# Patient Record
Sex: MALE | Race: WHITE | NOT HISPANIC OR LATINO | URBAN - METROPOLITAN AREA
[De-identification: names, ages, dates, MRNs, and addresses within clinical notes are randomized per-mention and may not be internally consistent; named-entity substitution may affect disease eponyms.]

---

## 2017-06-19 ENCOUNTER — OFFICE (AMBULATORY)
Dept: URBAN - METROPOLITAN AREA CLINIC 64 | Facility: CLINIC | Age: 72
End: 2017-06-19

## 2017-06-19 VITALS
HEIGHT: 64 IN | WEIGHT: 200 LBS | DIASTOLIC BLOOD PRESSURE: 65 MMHG | SYSTOLIC BLOOD PRESSURE: 115 MMHG | HEART RATE: 70 BPM

## 2017-06-19 DIAGNOSIS — K59.00 CONSTIPATION, UNSPECIFIED: ICD-10-CM

## 2017-06-19 DIAGNOSIS — R19.4 CHANGE IN BOWEL HABIT: ICD-10-CM

## 2017-06-19 PROCEDURE — 99202 OFFICE O/P NEW SF 15 MIN: CPT | Performed by: NURSE PRACTITIONER

## 2019-10-15 RX ORDER — LEVOTHYROXINE SODIUM 137 UG/1
137 TABLET ORAL DAILY
COMMUNITY

## 2019-10-15 RX ORDER — KETOCONAZOLE 20 MG/ML
SHAMPOO TOPICAL 2 TIMES WEEKLY
COMMUNITY

## 2019-10-15 RX ORDER — CYCLOBENZAPRINE HCL 5 MG
5 TABLET ORAL 2 TIMES DAILY PRN
COMMUNITY

## 2019-10-15 RX ORDER — SERTRALINE HYDROCHLORIDE 100 MG/1
100 TABLET, FILM COATED ORAL DAILY
COMMUNITY

## 2019-10-15 RX ORDER — FUROSEMIDE 40 MG/1
40 TABLET ORAL DAILY
COMMUNITY

## 2019-10-15 RX ORDER — WARFARIN SODIUM 5 MG/1
5 TABLET ORAL
COMMUNITY

## 2019-10-15 RX ORDER — MONTELUKAST SODIUM 10 MG/1
10 TABLET ORAL NIGHTLY
COMMUNITY

## 2019-10-15 RX ORDER — SIMVASTATIN 40 MG
40 TABLET ORAL NIGHTLY
COMMUNITY

## 2019-10-15 RX ORDER — POTASSIUM CHLORIDE 1.5 G/1.77G
20 POWDER, FOR SOLUTION ORAL DAILY
COMMUNITY

## 2019-10-15 RX ORDER — CARBOXYMETHYLCELLULOSE SODIUM 5 MG/ML
SOLUTION/ DROPS OPHTHALMIC 3 TIMES DAILY PRN
COMMUNITY

## 2019-10-15 RX ORDER — CLINDAMYCIN PHOSPHATE 10 UG/ML
LOTION TOPICAL 2 TIMES DAILY
COMMUNITY

## 2019-10-15 RX ORDER — KETOTIFEN FUMARATE 0.35 MG/ML
1 SOLUTION/ DROPS OPHTHALMIC 2 TIMES DAILY
COMMUNITY

## 2019-10-15 RX ORDER — CETIRIZINE HYDROCHLORIDE 10 MG/1
10 TABLET ORAL DAILY
COMMUNITY

## 2019-10-15 RX ORDER — LEVETIRACETAM 250 MG/1
250 TABLET ORAL 2 TIMES DAILY
COMMUNITY

## 2019-10-15 RX ORDER — BACLOFEN 20 MG/1
20 TABLET ORAL 3 TIMES DAILY
COMMUNITY

## 2019-10-17 ENCOUNTER — ANESTHESIA EVENT (OUTPATIENT)
Dept: GASTROENTEROLOGY | Facility: HOSPITAL | Age: 74
End: 2019-10-17

## 2019-10-18 ENCOUNTER — ANESTHESIA (OUTPATIENT)
Dept: GASTROENTEROLOGY | Facility: HOSPITAL | Age: 74
End: 2019-10-18

## 2019-10-18 ENCOUNTER — HOSPITAL ENCOUNTER (OUTPATIENT)
Facility: HOSPITAL | Age: 74
Setting detail: HOSPITAL OUTPATIENT SURGERY
Discharge: HOME | End: 2019-10-18
Attending: INTERNAL MEDICINE | Admitting: INTERNAL MEDICINE

## 2019-10-18 ENCOUNTER — ANESTHESIA EVENT (OUTPATIENT)
Dept: GASTROENTEROLOGY | Facility: HOSPITAL | Age: 74
End: 2019-10-18

## 2019-10-18 ENCOUNTER — ON CAMPUS - OUTPATIENT (AMBULATORY)
Dept: URBAN - METROPOLITAN AREA HOSPITAL 85 | Facility: HOSPITAL | Age: 74
End: 2019-10-18

## 2019-10-18 VITALS
OXYGEN SATURATION: 95 % | TEMPERATURE: 98.1 F | RESPIRATION RATE: 15 BRPM | HEIGHT: 64 IN | HEART RATE: 70 BPM | DIASTOLIC BLOOD PRESSURE: 53 MMHG | BODY MASS INDEX: 32.82 KG/M2 | SYSTOLIC BLOOD PRESSURE: 119 MMHG | WEIGHT: 192.24 LBS

## 2019-10-18 DIAGNOSIS — D12.5 BENIGN NEOPLASM OF SIGMOID COLON: ICD-10-CM

## 2019-10-18 DIAGNOSIS — Z80.0 FAMILY HISTORY OF MALIGNANT NEOPLASM OF DIGESTIVE ORGANS: ICD-10-CM

## 2019-10-18 DIAGNOSIS — D12.3 BENIGN NEOPLASM OF TRANSVERSE COLON: ICD-10-CM

## 2019-10-18 DIAGNOSIS — Z86.010 PERSONAL HISTORY OF COLONIC POLYPS: ICD-10-CM

## 2019-10-18 DIAGNOSIS — K64.1 SECOND DEGREE HEMORRHOIDS: ICD-10-CM

## 2019-10-18 DIAGNOSIS — Z86.010 PERSONAL HISTORY OF COLONIC POLYPS: Primary | ICD-10-CM

## 2019-10-18 DIAGNOSIS — K57.30 DIVERTICULOSIS OF LARGE INTESTINE WITHOUT PERFORATION OR ABS: ICD-10-CM

## 2019-10-18 PROCEDURE — 25010000002 PROPOFOL 200 MG/20ML EMULSION: Performed by: ANESTHESIOLOGY

## 2019-10-18 PROCEDURE — 88305 TISSUE EXAM BY PATHOLOGIST: CPT | Performed by: INTERNAL MEDICINE

## 2019-10-18 PROCEDURE — 45385 COLONOSCOPY W/LESION REMOVAL: CPT | Mod: PT | Performed by: INTERNAL MEDICINE

## 2019-10-18 PROCEDURE — 25010000002 GENTAMICIN PER 80 MG: Performed by: INTERNAL MEDICINE

## 2019-10-18 RX ORDER — SORBITOL SOLUTION 70 %
50 SOLUTION, ORAL MISCELLANEOUS
Status: DISPENSED | OUTPATIENT
Start: 2019-10-18 | End: 2019-10-18

## 2019-10-18 RX ORDER — SODIUM CHLORIDE 9 MG/ML
30 INJECTION, SOLUTION INTRAVENOUS CONTINUOUS PRN
Status: DISCONTINUED | OUTPATIENT
Start: 2019-10-18 | End: 2019-10-18 | Stop reason: HOSPADM

## 2019-10-18 RX ORDER — SODIUM CHLORIDE 0.9 % (FLUSH) 0.9 %
3 SYRINGE (ML) INJECTION EVERY 12 HOURS SCHEDULED
Status: DISCONTINUED | OUTPATIENT
Start: 2019-10-18 | End: 2019-10-18 | Stop reason: HOSPADM

## 2019-10-18 RX ORDER — SODIUM CHLORIDE 0.9 % (FLUSH) 0.9 %
10 SYRINGE (ML) INJECTION AS NEEDED
Status: DISCONTINUED | OUTPATIENT
Start: 2019-10-18 | End: 2019-10-18 | Stop reason: HOSPADM

## 2019-10-18 RX ORDER — SODIUM CHLORIDE 9 MG/ML
9 INJECTION, SOLUTION INTRAVENOUS CONTINUOUS PRN
Status: DISCONTINUED | OUTPATIENT
Start: 2019-10-18 | End: 2019-10-18 | Stop reason: HOSPADM

## 2019-10-18 RX ORDER — SODIUM CHLORIDE 0.9 % (FLUSH) 0.9 %
3-10 SYRINGE (ML) INJECTION AS NEEDED
Status: DISCONTINUED | OUTPATIENT
Start: 2019-10-18 | End: 2019-10-18 | Stop reason: HOSPADM

## 2019-10-18 RX ORDER — PROPOFOL 10 MG/ML
INJECTION, EMULSION INTRAVENOUS AS NEEDED
Status: DISCONTINUED | OUTPATIENT
Start: 2019-10-18 | End: 2019-10-18 | Stop reason: SURG

## 2019-10-18 RX ORDER — LIDOCAINE HYDROCHLORIDE 20 MG/ML
INJECTION, SOLUTION EPIDURAL; INFILTRATION; INTRACAUDAL; PERINEURAL AS NEEDED
Status: DISCONTINUED | OUTPATIENT
Start: 2019-10-18 | End: 2019-10-18 | Stop reason: SURG

## 2019-10-18 RX ORDER — PHENYLEPHRINE HCL IN 0.9% NACL 0.5 MG/5ML
SYRINGE (ML) INTRAVENOUS AS NEEDED
Status: DISCONTINUED | OUTPATIENT
Start: 2019-10-18 | End: 2019-10-18 | Stop reason: SURG

## 2019-10-18 RX ADMIN — GENTAMICIN SULFATE 80 MG: 40 INJECTION, SOLUTION INTRAMUSCULAR; INTRAVENOUS at 12:50

## 2019-10-18 RX ADMIN — LIDOCAINE HYDROCHLORIDE 100 MG: 20 INJECTION, SOLUTION EPIDURAL; INFILTRATION; INTRACAUDAL; PERINEURAL at 07:35

## 2019-10-18 RX ADMIN — SODIUM CHLORIDE 9 ML/HR: 0.9 INJECTION, SOLUTION INTRAVENOUS at 07:03

## 2019-10-18 RX ADMIN — PROPOFOL 700 MG: 10 INJECTION, EMULSION INTRAVENOUS at 15:13

## 2019-10-18 RX ADMIN — SORBITOL SOLUTION (BULK) 50 ML: 70 SOLUTION at 08:32

## 2019-10-18 RX ADMIN — PHENYLEPHRINE HYDROCHLORIDE 100 MCG: 10 INJECTION INTRAVENOUS at 14:33

## 2019-10-18 RX ADMIN — PROPOFOL 100 MG: 10 INJECTION, EMULSION INTRAVENOUS at 07:35

## 2019-10-18 NOTE — ANESTHESIA PREPROCEDURE EVALUATION
Anesthesia Evaluation     Patient summary reviewed and Nursing notes reviewed   NPO Solid Status: > 8 hours  NPO Liquid Status: > 8 hours           Airway   Dental      Pulmonary    (+) COPD moderate, asthma,   Cardiovascular     (+) hypertension, CHF, hyperlipidemia,       Neuro/Psych  (+) seizures, CVA residual symptoms,       ROS Comment: L hemiparesis  GI/Hepatic/Renal/Endo    (+)  GERD,      Musculoskeletal     Abdominal    Substance History      OB/GYN          Other   (+) arthritis         Phys Exam Other: See previous preop evaluation, from this am.                Anesthesia Plan    ASA 3     MAC     Anesthetic plan, all risks, benefits, and alternatives have been provided, discussed and informed consent has been obtained with: patient.

## 2019-10-18 NOTE — H&P
" LOS: 0 days   Patient Care Team:  Miles Peña IV, MD as PCP - General (Internal Medicine)      Subjective     Interval History:     Subjective: Family history of colon cancer      ROS:   No chest pain, shortness of breath, or cough.  No melena or hematochezia  No change since scanned H&P       Medication Review:     Current Facility-Administered Medications:   •  ampicillin 2 gm IVPB in 100 ml NS (MBP), 2 g, Intravenous, Once, Mo Riggins MD  •  sodium chloride 0.9 % flush 10 mL, 10 mL, Intravenous, PRN, Mo Riggins MD  •  sodium chloride 0.9 % flush 10 mL, 10 mL, Intravenous, PRN, Mo Riggins MD  •  sodium chloride 0.9 % flush 3 mL, 3 mL, Intravenous, Q12H, Catie Lobato CRNA  •  sodium chloride 0.9 % flush 3 mL, 3 mL, Intravenous, Q12H, Mo Riggins MD  •  sodium chloride 0.9 % flush 3 mL, 3 mL, Intravenous, Q12H, Mo Riggins MD  •  sodium chloride 0.9 % flush 3-10 mL, 3-10 mL, Intravenous, PRN, Catie Lobato CRNA  •  sodium chloride 0.9 % infusion, 9 mL/hr, Intravenous, Continuous PRN, Catie Lobato CRNA, Last Rate: 9 mL/hr at 10/18/19 0703  •  sodium chloride 0.9 % infusion, 30 mL/hr, Intravenous, Continuous PRN, Mo Riggins MD  •  sodium chloride 0.9 % infusion, 30 mL/hr, Intravenous, Continuous PRN, Mo Riggins MD      Objective     Vital Signs  Vitals:    10/18/19 0645 10/18/19 0743 10/18/19 0800 10/18/19 0813   BP: 123/62 120/56 133/58 126/69   BP Location: Right arm      Patient Position: Lying      Pulse: 70 70 70 70   Resp: 16 14 14 15   Temp: 98.1 °F (36.7 °C)      TempSrc: Oral      SpO2: 95% 100% 100% 95%   Weight: 87.2 kg (192 lb 3.9 oz)      Height: 162.6 cm (64\")          Physical Exam:    General Appearance:    Awake and alert, in no acute distress   Head:    Normocephalic, without obvious abnormality   Eyes:          Conjunctivae normal, anicteric sclera   Throat:   No oral lesions, no thrush, oral mucosa " moist   Neck:   No adenopathy, supple, no JVD   CV/Lungs:     RRR, respirations regular, even and unlabored   Abdomen:     Soft, non-tender, no rebound or guarding, non-distended, no hepatosplenomegaly   Rectal:     Deferred   Extremities:   No edema, no cyanosis   Skin:   No bruising or rash, no jaundice        Results Review:    Lab Results (last 24 hours)     Procedure Component Value Units Date/Time    Protime-INR [[id number]] Updated:  10/18/19 0706    Specimen:  Blood           Imaging Results (last 24 hours)     ** No results found for the last 24 hours. **            Assessment/Plan   Proceed with scope and MAC anesthesia    Proceed with surveillance colonoscopy    Mo Riggins MD  10/18/19  1:52 PM

## 2019-10-18 NOTE — OP NOTE
COLONOSCOPY  Procedure Report    Patient Name:  Elia Manuel  YOB: 1945    Date of Surgery:  10/18/2019     Indications: Family history of colon cancer  Pre-op Diagnosis:   PERSONAL HX COLON POLYPS/ FAM HX COLON CANCER         Post-op Diagnosis:  Inadequate prep, procedure aborted      Procedure(s):  COLONOSCOPY    Staff:  Surgeon(s):  Mo Riggins MD         Anesthesia: Monitored Anesthesia Care    Estimated Blood Loss: None  Implants:    Nothing was implanted during the procedure    Specimen:          None    Complications:  None    Description of Procedure:  Informed consent was obtained from the patient.  They were placed in the left lateral decubitus position and IV conscious sedation was administered by anesthesia while being monitored continuously throughout the procedure.  Digital rectal exam was performed revealing thick stool in the rectal vault.  The scope was introduced into the rectum and the prep was completely inadequate to proceed and the procedure was aborted.    After additional prep the patient was brought back and re-sedated in the left lateral decubitus position.  Digital rectal exam revealed no external hemorrhoid fissure or fistula digital exam of the anal canal rectal vault was unremarkable.  The video Olympus colonoscope was introduced into the rectum and with great difficulty due to a long and tortuous colon and fairly poor prep, I was able to reach the cecum and positively identified and photographed the ileocecal valve and appendiceal orifice.  The prep was fair in the ascending and transverse colon and fair to poor in the descending and sigmoid colon although with over 3 L of lavage and suctioning, we had reasonable visualization of the mucosal and were able to identify a transverse colon polyp that was 1 cm snared with electrocautery hemostasis and removed.  An additional 8 mm semi-pedunculated descending colon polyp was snared and removed with  electrocautery hemostasis.  Polyps under 5 mm could have been missed within the limits of this prep.  Scattered diverticulosis was noted grade 2 internal hemorrhoids were found no ischemic vascular inflammatory lesions were seen the scope was removed he tolerated the procedure well returned to recovery in stable condition.    Impression:  Inadequate prep.  After reprep colonoscopy was carried out with polypectomy x2    Recommendations:  The patient will be given additional prep and placed on the end of the schedule today and repeat attempt at colonoscopy will be made.  I discussed this with his wife and she is agreeable.  They would like to proceed with preoperative antibiotics although there are no recommendations for this any longer.  However in accordance with her wishes we will give them IV gentamicin ampicillin preoperatively.    1.  Call for any postop pain fever bleeding  2.  Would recommend Benefiber and MiraLAX daily  3.  Call in 3 days for biopsies  4.  No recall for colonoscopy      Mo Riggins MD     Date: 10/18/2019  Time: 7:46 AM

## 2019-10-18 NOTE — ANESTHESIA PREPROCEDURE EVALUATION
Anesthesia Evaluation     Patient summary reviewed and Nursing notes reviewed   NPO Solid Status: > 8 hours  NPO Liquid Status: > 8 hours           Airway   No difficulty expected  Dental      Pulmonary - normal exam   (+) COPD, asthma,   Cardiovascular     (+) hypertension, CHF, hyperlipidemia,     ROS comment: S/p mvr.  Hx CHF    Neuro/Psych  (+) seizures, CVA residual symptoms,     GI/Hepatic/Renal/Endo    (+)  GERD,      Musculoskeletal     Abdominal    Substance History      OB/GYN          Other   (+) arthritis                   Anesthesia Plan    ASA 3     MAC     Anesthetic plan, all risks, benefits, and alternatives have been provided, discussed and informed consent has been obtained with: patient.

## 2019-10-18 NOTE — H&P
" LOS: 0 days   Patient Care Team:  Miles Peña IV, MD as PCP - General (Internal Medicine)      Subjective     Interval History:     Subjective: Surveillance colonoscopy.  Colonoscopy 2011 revealed 2 hyperplastic polyps.  Mother, however, had colon cancer.  Preoperative INR 1.30    ROS:   No chest pain, shortness of breath, or cough.  No melena or hematochezia  No change since scanned H&P       Medication Review:     Current Facility-Administered Medications:   •  sodium chloride 0.9 % flush 3 mL, 3 mL, Intravenous, Q12H, CheryleCatie, CRNA  •  sodium chloride 0.9 % flush 3-10 mL, 3-10 mL, Intravenous, PRN, Cheryle Catie, CRNA  •  sodium chloride 0.9 % infusion, 9 mL/hr, Intravenous, Continuous PRN, Catie Lobato, CRNA, Last Rate: 9 mL/hr at 10/18/19 0703, 9 mL/hr at 10/18/19 0703      Objective     Vital Signs  Vitals:    10/15/19 1248 10/18/19 0645   BP:  123/62   BP Location:  Right arm   Patient Position:  Lying   Pulse:  70   Resp:  16   Temp:  98.1 °F (36.7 °C)   TempSrc:  Oral   SpO2:  95%   Weight: 86.2 kg (190 lb) 87.2 kg (192 lb 3.9 oz)   Height: 165.1 cm (65\") 162.6 cm (64\")       Physical Exam:    General Appearance:    Awake and alert, in no acute distress   Head:    Normocephalic, without obvious abnormality   Eyes:          Conjunctivae normal, anicteric sclera   Throat:   No oral lesions, no thrush, oral mucosa moist   Neck:   No adenopathy, supple, no JVD   CV/Lungs:     RRR, respirations regular, even and unlabored   Abdomen:     Soft, non-tender, no rebound or guarding, non-distended, no hepatosplenomegaly   Rectal:     Deferred   Extremities:   No edema, no cyanosis   Skin:   No bruising or rash, no jaundice        Results Review:    Lab Results (last 24 hours)     Procedure Component Value Units Date/Time    Protime-INR [[id number]] Updated:  10/18/19 0706    Specimen:  Blood           Imaging Results (last 24 hours)     ** No results found for the last 24 hours. **            Assessment/Plan "   Proceed with scope and MAC anesthesia    Proceed with surveillance colonoscopy.  Explained to patient and wife that antibiotic prophylaxis is no longer recommended for GI procedures that are not at high risk for systemic bacteremia which includes colonoscopy.    Mo Riggins MD  10/18/19  7:19 AM

## 2019-10-18 NOTE — ANESTHESIA POSTPROCEDURE EVALUATION
Patient: Elia Manuel    Procedure Summary     Date:  10/18/19 Room / Location:   [facility]    Anesthesia Start:  0725 Anesthesia Stop:  0743    Procedure:  COLONOSCOPY attempted (N/A Rectum) Diagnosis:  (PERSONAL HX COLON POLYPS/ FAM HX COLON CANCER)    Surgeon:  Mo Riggins MD Provider:  Adele Guardado MD    Anesthesia Type:  MAC ASA Status:  3          Anesthesia Type: MAC  Last vitals  BP   135/57 (10/18/19 1524)   Temp   98.1 °F (36.7 °C) (10/18/19 0645)   Pulse   70 (10/18/19 1524)   Resp   15 (10/18/19 0813)     SpO2   98 % (10/18/19 1524)     Post Anesthesia Care and Evaluation    Patient participation: complete - patient participated  Level of consciousness: awake  Pain management: adequate  Airway patency: patent  Anesthetic complications: No anesthetic complications    Cardiovascular status: acceptable  Respiratory status: acceptable    Comments: Pt to take additional prep to facilitate exam, to be done later in the day.

## 2019-10-20 NOTE — ANESTHESIA POSTPROCEDURE EVALUATION
Patient: Elia Manuel    Procedure Summary     Date:  10/18/19 Room / Location:  [facility] / [facility]    Anesthesia Start:  1355 Anesthesia Stop:  1518    Procedure:  COLONOSCOPY with polypectomy x 2 and fulguate  1 polyp (N/A ) Diagnosis:       Personal history of colonic polyps      (Personal history of colonic polyps [Z86.010])    Surgeon:  Mo Riggins MD Provider:  Adele Guardado MD    Anesthesia Type:  MAC ASA Status:  3          Anesthesia Type: MAC  Last vitals  BP   119/53 (10/18/19 1528)   Temp   98.1 °F (36.7 °C) (10/18/19 0645)   Pulse   70 (10/18/19 1528)   Resp   15 (10/18/19 0813)     SpO2   95 % (10/18/19 1528)     Post Anesthesia Care and Evaluation    Patient location during evaluation: PHASE II  Patient participation: complete - patient participated  Level of consciousness: awake  Pain scale: See nurse's notes for pain score.  Pain management: adequate  Airway patency: patent  Anesthetic complications: No anesthetic complications  PONV Status: none  Cardiovascular status: acceptable  Respiratory status: acceptable  Hydration status: acceptable    Comments: Patient seen and examined postoperatively; vital signs stable; SpO2 greater than or equal to 90%; cardiopulmonary status stable; nausea/vomiting adequately controlled; pain adequately controlled; no apparent anesthesia complications; patient discharged from anesthesia care when discharge criteria were met

## 2019-10-21 LAB
LAB AP CASE REPORT: NORMAL
PATH REPORT.FINAL DX SPEC: NORMAL
PATH REPORT.GROSS SPEC: NORMAL

## 2020-01-01 ENCOUNTER — ON CAMPUS - OUTPATIENT (AMBULATORY)
Dept: URBAN - METROPOLITAN AREA HOSPITAL 77 | Facility: HOSPITAL | Age: 75
End: 2020-01-01

## 2020-01-01 ENCOUNTER — OFFICE (AMBULATORY)
Dept: URBAN - METROPOLITAN AREA CLINIC 64 | Facility: CLINIC | Age: 75
End: 2020-01-01

## 2020-01-01 VITALS
HEIGHT: 64 IN | SYSTOLIC BLOOD PRESSURE: 142 MMHG | WEIGHT: 200 LBS | HEART RATE: 70 BPM | DIASTOLIC BLOOD PRESSURE: 77 MMHG

## 2020-01-01 DIAGNOSIS — E03.9 HYPOTHYROIDISM, UNSPECIFIED: ICD-10-CM

## 2020-01-01 DIAGNOSIS — K59.00 CONSTIPATION, UNSPECIFIED: ICD-10-CM

## 2020-01-01 DIAGNOSIS — I48.91 UNSPECIFIED ATRIAL FIBRILLATION: ICD-10-CM

## 2020-01-01 DIAGNOSIS — Z95.2 PRESENCE OF PROSTHETIC HEART VALVE: ICD-10-CM

## 2020-01-01 PROCEDURE — 99213 OFFICE O/P EST LOW 20 MIN: CPT | Performed by: NURSE PRACTITIONER

## 2020-01-01 RX ORDER — LINACLOTIDE 290 UG/1
290 CAPSULE, GELATIN COATED ORAL
Qty: 90 | Refills: 3 | Status: ACTIVE
Start: 2020-01-01

## (undated) DEVICE — PK ENDO GI 50

## (undated) DEVICE — SNAR POLYP CAPTIVATOR HEX 27MM 240CM

## (undated) DEVICE — PAPR PRNT PK SONY W RIBN UPC55

## (undated) DEVICE — TRAP WIDEEYE POLYP